# Patient Record
(demographics unavailable — no encounter records)

---

## 2025-01-09 NOTE — DISCUSSION/SUMMARY
[FreeTextEntry1] : Left temporal lobe epilepsy  Doing well. No seizure recurrence. Tolerating Keppra and Lamictal. Will check AED levels and follow up in April if stable.  Plan: -Check serum trough Keppra and Lamictal -Continue LTG XR 150mg BID -Continue Keppra XR 1500-1750mg  -RPA in April if stable  All questions and concerns were addressed to the best of my ability. Emotional support was rendered.

## 2025-01-09 NOTE — HISTORY OF PRESENT ILLNESS
[FreeTextEntry1] : Ms. DUMONT presents today for a follow up visit of left temporal lobe epilepsy. Episodes characterized by butterfly feeling in her stomach, at times associated with de ja haylie that occur in clusters.   [facility] 72 hour VEEG in 2022  showed intermittent polymorphic delta and theta slowing in the left temporal region. Started on Keppra XR.  Last AEEG from 2023 was stable showing left temporal slowing. _________________________________________________   Delivered a healthy baby girl via  In 2024. Baby is doing very well.   She denies any known seizure, seizure-like activity, LOC, lapses in time, tongue biting, epigastric uprising, de ja haylie, de-realization, confusion, involuntary muscle movement, staring spells or urinary incontinence   Continues to be on Keppra XR 1500mg BID and LTG XR 150mg BID. No side effects reported. No recent trough levels.   She denies any headaches, vision changes, double vision, vertigo, lightheadedness, dizziness, numbness, tingling, muscle or joint aches or changes in balance. Currently sick with a URI.       DISPLAY PLAN FREE TEXT

## 2025-06-04 NOTE — HISTORY OF PRESENT ILLNESS
[FreeTextEntry8] : 42-year-old female here for acute visit with c/o sore throat and cough. Reports sore throat which began 4 days ago, a/w dry cough. Does have discomfort swallowing, denies voice hoarseness.  Has had nighttime awakenings from excess coughing.   Denies fever, chills, night sweats or fatigue.  Denies nasal congestion, postnasal drip, sinus pressure or earache. Notes she takes Budesonide nasal spray for seasonal allergies.  She is able to eat food & drink water with no limitation. Does have tender lymph nodes bilaterally.  Denies headache and dizziness.  Denies chest pain, palpitation, SOB, wheezing. Has tried warm water with lemon & honey, tea with no relief,  Denies any known sick contacts